# Patient Record
Sex: MALE | Race: WHITE | NOT HISPANIC OR LATINO | Employment: OTHER | URBAN - METROPOLITAN AREA
[De-identification: names, ages, dates, MRNs, and addresses within clinical notes are randomized per-mention and may not be internally consistent; named-entity substitution may affect disease eponyms.]

---

## 2023-12-15 ENCOUNTER — HOSPITAL ENCOUNTER (EMERGENCY)
Facility: MEDICAL CENTER | Age: 58
End: 2023-12-15
Attending: EMERGENCY MEDICINE

## 2023-12-15 ENCOUNTER — APPOINTMENT (OUTPATIENT)
Dept: RADIOLOGY | Facility: MEDICAL CENTER | Age: 58
End: 2023-12-15
Attending: EMERGENCY MEDICINE

## 2023-12-15 VITALS
HEIGHT: 71 IN | WEIGHT: 160 LBS | OXYGEN SATURATION: 98 % | BODY MASS INDEX: 22.4 KG/M2 | TEMPERATURE: 97.9 F | RESPIRATION RATE: 18 BRPM | DIASTOLIC BLOOD PRESSURE: 86 MMHG | HEART RATE: 84 BPM | SYSTOLIC BLOOD PRESSURE: 149 MMHG

## 2023-12-15 DIAGNOSIS — S09.90XA CLOSED HEAD INJURY, INITIAL ENCOUNTER: ICD-10-CM

## 2023-12-15 DIAGNOSIS — T14.8XXA ABRASION: ICD-10-CM

## 2023-12-15 DIAGNOSIS — S06.0XAA CONCUSSION WITH UNKNOWN LOSS OF CONSCIOUSNESS STATUS, INITIAL ENCOUNTER: ICD-10-CM

## 2023-12-15 PROCEDURE — 99284 EMERGENCY DEPT VISIT MOD MDM: CPT

## 2023-12-15 PROCEDURE — A9270 NON-COVERED ITEM OR SERVICE: HCPCS | Performed by: EMERGENCY MEDICINE

## 2023-12-15 PROCEDURE — 700102 HCHG RX REV CODE 250 W/ 637 OVERRIDE(OP): Performed by: EMERGENCY MEDICINE

## 2023-12-15 PROCEDURE — 700111 HCHG RX REV CODE 636 W/ 250 OVERRIDE (IP): Performed by: EMERGENCY MEDICINE

## 2023-12-15 PROCEDURE — 90471 IMMUNIZATION ADMIN: CPT

## 2023-12-15 PROCEDURE — 70450 CT HEAD/BRAIN W/O DYE: CPT

## 2023-12-15 PROCEDURE — 90715 TDAP VACCINE 7 YRS/> IM: CPT | Performed by: EMERGENCY MEDICINE

## 2023-12-15 PROCEDURE — 72125 CT NECK SPINE W/O DYE: CPT

## 2023-12-15 RX ORDER — OXYCODONE HYDROCHLORIDE AND ACETAMINOPHEN 5; 325 MG/1; MG/1
2 TABLET ORAL ONCE
Status: COMPLETED | OUTPATIENT
Start: 2023-12-15 | End: 2023-12-15

## 2023-12-15 RX ORDER — ONDANSETRON 4 MG/1
4 TABLET, ORALLY DISINTEGRATING ORAL ONCE
Status: COMPLETED | OUTPATIENT
Start: 2023-12-15 | End: 2023-12-15

## 2023-12-15 RX ADMIN — CLOSTRIDIUM TETANI TOXOID ANTIGEN (FORMALDEHYDE INACTIVATED), CORYNEBACTERIUM DIPHTHERIAE TOXOID ANTIGEN (FORMALDEHYDE INACTIVATED), BORDETELLA PERTUSSIS TOXOID ANTIGEN (GLUTARALDEHYDE INACTIVATED), BORDETELLA PERTUSSIS FILAMENTOUS HEMAGGLUTININ ANTIGEN (FORMALDEHYDE INACTIVATED), BORDETELLA PERTUSSIS PERTACTIN ANTIGEN, AND BORDETELLA PERTUSSIS FIMBRIAE 2/3 ANTIGEN 0.5 ML: 5; 2; 2.5; 5; 3; 5 INJECTION, SUSPENSION INTRAMUSCULAR at 17:19

## 2023-12-15 RX ADMIN — OXYCODONE AND ACETAMINOPHEN 2 TABLET: 5; 325 TABLET ORAL at 17:19

## 2023-12-15 RX ADMIN — ONDANSETRON 4 MG: 4 TABLET, ORALLY DISINTEGRATING ORAL at 17:19

## 2023-12-16 NOTE — DISCHARGE INSTRUCTIONS
Please discuss the following findings with your regular doctor:  CT-CSPINE WITHOUT PLUS RECONS   Final Result      1.  No evidence of cervical spine fracture or subluxation.      2.  Large anterior bridging osteophytes in the mid and lower cervical spine.      3.  Moderate osteoarthritic changes at the C5-6 level.      CT-HEAD W/O   Final Result      Head CT without contrast within normal limits. No evidence of acute cerebral infarction, hemorrhage or mass lesion.           Labs Reviewed - No data to display

## 2023-12-16 NOTE — ED TRIAGE NOTES
"Chief Complaint   Patient presents with    T-5000 FALL     MGLF yesterday at 2230 +head strike +LOC - unknown time -Thinners     Patient BIB [facility] for the above complaint. Patient states that he tripped and fell on the curb last night at 2230 and struck his head. Patient has an abrasion and swelling noted to right forehead and states that pain is 8/10. Patient is unsure of how long he passed out for and does not take thinners. Patient ambulatory with unsteady gait due to dizziness. Patient attempted to make his flight back home to [facility] today and was directed buy airport personnel to seek medical attention before flying as a precaution due to unsteady gait and noted head injury.    /89   Pulse 96   Temp 36.9 °C (98.4 °F) (Temporal)   Resp 20   Ht 1.803 m (5' 11\")   Wt 72.6 kg (160 lb)   SpO2 96%    "

## 2023-12-16 NOTE — ED NOTES
Assumed pt. Care at this time, pt. Resting, no sings of distress, states he fell yesterday out of the shuttle and hit his head, denies loc, complaints

## 2023-12-16 NOTE — ED PROVIDER NOTES
"  ER Provider Note    Scribed for Fan Lomeli M.d. by Sourav Suh. 12/15/2023  5:28 PM    Primary Care Provider: No primary care provider reported.    CHIEF COMPLAINT  Chief Complaint   Patient presents with    T-5000 FALL     MGLF yesterday at 2230 +head strike +LOC - unknown time -Thinners     EXTERNAL RECORDS REVIEWED  Other No prior records available for review    HPI/ROS  LIMITATION TO HISTORY   Select: : None    OUTSIDE HISTORIAN(S):  None    Hector Vigil is a 58 y.o. male who presents to the ED via EMS for evaluation of injuries after a mid ground level fall onset yesterday. The patient states he had multiple alcoholic drinks last night and while walking outside he tripped and fell, hitting his head on a curb at approximately 10:30 PM. He also states he lost consciousness at this time and is unsure how long he passed out for. He is visiting [place], so he attempted to take a flight back to [place] today where he was directed by airport personnel to seek medical attention prior to flying as a precaution due to appearance of unsteady gait and head injury. Upon presenting to the ED the patient states his pain is currently an 8/10 and he reports associated symptoms of dizziness, which is causing his unsteady gait. He denies any vomiting. There are no known alleviating or exacerbating factors. The patient is not on blood thinners.     PAST MEDICAL HISTORY  History reviewed. No pertinent past medical history.    SURGICAL HISTORY  History reviewed. No pertinent surgical history.    FAMILY HISTORY  History reviewed. No pertinent family history.    SOCIAL HISTORY   reports that he has been smoking cigars. He has never used smokeless tobacco. He reports current alcohol use. He reports that he does not use drugs.    CURRENT MEDICATIONS  No current outpatient medications.    ALLERGIES  Hydrocodone    PHYSICAL EXAM  /89   Pulse 96   Temp 36.9 °C (98.4 °F) (Temporal)   Resp 20   Ht 1.803 m (5' 11\")   Wt " 72.6 kg (160 lb)   SpO2 96%   BMI 22.32 kg/m²   Constitutional: Alert in no apparent distress.  HENT: Right sided facial abrasions, Bilateral external ears normal, Nose normal. Uvula midline.   Eyes: Pupils are equal and reactive, Conjunctiva normal, Non-icteric.   Neck: Normal range of motion, No tenderness, Supple, No stridor.   Lymphatic: No lymphadenopathy noted.   Cardiovascular: Regular rate and rhythm, no murmurs.   Thorax & Lungs: Normal breath sounds, No respiratory distress, No wheezing, No chest tenderness.   Abdomen: Bowel sounds normal, Soft, No tenderness, No peritoneal signs, No masses, No pulsatile masses.   Skin: Warm, Dry, No erythema, No rash.   Back: No bony tenderness, No CVA tenderness.   Extremities: Intact distal pulses, No edema, No tenderness, No cyanosis.  Musculoskeletal: Good range of motion in all major joints. No tenderness to palpation or major deformities noted.   Neurologic: Alert , Normal motor function, Normal sensory function, No focal deficits noted.   Cranial nerves II through XII intact.  5 out of 5 strength x4.  Sensation intact light touch.  Normal finger-nose-finger.  Normal reflexes bilaterally.  No clonus. EOMI. PERRL.    Psychiatric: Affect normal, Judgment normal, Mood normal.        DIAGNOSTIC STUDIES      Radiology:   The attending emergency physician has independently interpreted the diagnostic imaging associated with this visit and am waiting the final reading from the radiologist.   Preliminary interpretation is a follows: no ICH  Radiologist interpretation:     CT-CSPINE WITHOUT PLUS RECONS   Final Result      1.  No evidence of cervical spine fracture or subluxation.      2.  Large anterior bridging osteophytes in the mid and lower cervical spine.      3.  Moderate osteoarthritic changes at the C5-6 level.      CT-HEAD W/O   Final Result      Head CT without contrast within normal limits. No evidence of acute cerebral infarction, hemorrhage or mass lesion.               COURSE & MEDICAL DECISION MAKING     ED Observation Status? No; Patient does not meet criteria for ED Observation.     INITIAL ASSESSMENT, COURSE AND PLAN  Care Narrative: 58 y.o. F p/w CC of head injury after ground level fall, onset yesterday.      4:32 PM - Patient seen and examined at bedside. Discussed plan of care, including CT imaging. Patient agrees to the plan of care. The patient will be medicated with Zofran tab 4 mg and Percocet 5/325 mg 2 tablets, and his tetanus will be updated. Ordered for CT-Head without and CT-Cspine without plus recons to evaluate his symptoms.      Pt is [place] head CT positive given EtOH and new ataxia therefor elected to obtain imaging at this time  No C/T/L spine TTP, doubt fx  No obvious extremity injury  No intrathoracic or abd pain to suggest PTX, rib fx or BAT  Pt ambulates w/o assistance and w/ steady gait prior to d/c  Pt tolerating PO prior to dc      6:46 PM - Patient reevaluated at bedside. The patient informs me they feel improved following medication administration. I discussed the patient's diagnostic study results which are reassuring. Discussed plan for discharge; I advised the patient to establish care with a PCP and follow-up with them, and to return to the [facility] ED with any new or worsening symptoms. Patient was given the opportunity for questions. I addressed all questions or concerns and the patient is stable for discharge at this time. Patient verbalizes understanding and support with my plan for discharge.           DISPOSITION AND DISCUSSIONS  I have discussed management of the patient with the following physicians and JO ANN's:  None    Discussion of management with other QHP or appropriate source(s): None     Escalation of care considered, and ultimately not performed: acute inpatient care management, however at this time, the patient is most appropriate for outpatient management.    Barriers to care at this time, including but not limited to:  Patient does not have established PCP.     Decision tools and prescription drugs considered including, but not limited to:  [place] head CT rule positive given EtOH and new ataxia  .    The patient will return for new or worsening symptoms and is stable at the time of discharge.    The patient is referred to a primary physician for blood pressure management, diabetic screening, and for all other preventative health concerns.      DISPOSITION:  Patient will be discharged home in stable condition.    FOLLOW UP:  [facility], Emergency Dept  [address] [facility] [id number] [phone]    If symptoms worsen    [facility]  [address] [facility] [address]  [phone]  In 3 days        FINAL DIAGNOSIS  1. Closed head injury, initial encounter    2. Abrasion    3. Concussion with unknown loss of consciousness status, initial encounter        Sourav ORTEGA (Cieloiberin), am scribing for, and in the presence of, Fan Lomeli M.D..    Electronically signed by: Sourav Suh (Scribe), 12/15/2023    IFan M.D. personally performed the services described in this documentation, as scribed by Sourav Suh in my presence, and it is both accurate and complete.      The note accurately reflects work and decisions made by me.  Fan Lomeli M.D.  12/15/2023  8:44 PM

## 2023-12-16 NOTE — ED NOTES
Pt discharged home, discharge and follow up care instructions given, pt verbalized understanding. IV removed, pt ambulated out of ED independently.